# Patient Record
Sex: MALE | Race: WHITE | Employment: FULL TIME | URBAN - METROPOLITAN AREA
[De-identification: names, ages, dates, MRNs, and addresses within clinical notes are randomized per-mention and may not be internally consistent; named-entity substitution may affect disease eponyms.]

---

## 2017-10-08 ENCOUNTER — HOSPITAL ENCOUNTER (OUTPATIENT)
Age: 37
Discharge: HOME | End: 2017-10-08
Attending: FAMILY MEDICINE
Payer: COMMERCIAL

## 2017-10-08 VITALS
HEART RATE: 81 BPM | HEIGHT: 76 IN | TEMPERATURE: 98 F | WEIGHT: 260 LBS | OXYGEN SATURATION: 99 % | DIASTOLIC BLOOD PRESSURE: 62 MMHG | SYSTOLIC BLOOD PRESSURE: 149 MMHG | RESPIRATION RATE: 18 BRPM | BODY MASS INDEX: 31.66 KG/M2

## 2017-10-08 DIAGNOSIS — H10.33 ACUTE CONJUNCTIVITIS OF BOTH EYES, UNSPECIFIED ACUTE CONJUNCTIVITIS TYPE: ICD-10-CM

## 2017-10-08 DIAGNOSIS — J02.9 VIRAL PHARYNGITIS: Primary | ICD-10-CM

## 2017-10-08 PROCEDURE — 87430 STREP A AG IA: CPT

## 2017-10-08 PROCEDURE — 99204 OFFICE O/P NEW MOD 45 MIN: CPT

## 2017-10-08 PROCEDURE — 99203 OFFICE O/P NEW LOW 30 MIN: CPT

## 2017-10-08 RX ORDER — TOBRAMYCIN 3 MG/ML
2 SOLUTION/ DROPS OPHTHALMIC EVERY 4 HOURS
Qty: 1 BOTTLE | Refills: 1 | Status: SHIPPED | OUTPATIENT
Start: 2017-10-08 | End: 2017-10-13

## 2017-10-08 NOTE — ED PROVIDER NOTES
Patient Seen in: [facility]    History   Patient presents with:  Sore Throat  Cough/URI    Stated Complaint: sore throat    Pt p/w co OU redness, irritation--onset w/OS 3 d ago, now OD x I d;  Also c/s.t. X 5 d, voice los for stated complaint: sore throat  Other systems are as noted in HPI. Constitutional and vital signs reviewed. All other systems reviewed and negative except as noted above.     PSFH elements reviewed from today and agreed except as otherwise stated i

## 2017-10-08 NOTE — ED INITIAL ASSESSMENT (HPI)
Sore throat for 5 days. No fever. 3 days of bilateral eye redness and tearing. \"Crusty\" drainage to both eyes. Does not wear corrective lenses. No N/V/D.

## 2024-01-08 ENCOUNTER — LAB ENCOUNTER (OUTPATIENT)
Dept: LAB | Age: 44
End: 2024-01-08
Attending: INTERNAL MEDICINE
Payer: COMMERCIAL

## 2024-01-08 ENCOUNTER — OFFICE VISIT (OUTPATIENT)
Dept: INTERNAL MEDICINE CLINIC | Facility: CLINIC | Age: 44
End: 2024-01-08

## 2024-01-08 VITALS
DIASTOLIC BLOOD PRESSURE: 72 MMHG | BODY MASS INDEX: 28.73 KG/M2 | WEIGHT: 238.38 LBS | SYSTOLIC BLOOD PRESSURE: 138 MMHG | RESPIRATION RATE: 17 BRPM | HEIGHT: 76.5 IN | HEART RATE: 80 BPM

## 2024-01-08 DIAGNOSIS — Z00.00 ANNUAL PHYSICAL EXAM: Primary | ICD-10-CM

## 2024-01-08 DIAGNOSIS — Z00.00 ANNUAL PHYSICAL EXAM: ICD-10-CM

## 2024-01-08 PROBLEM — N52.9 ERECTILE DYSFUNCTION: Status: ACTIVE | Noted: 2024-01-08

## 2024-01-08 LAB
ALBUMIN SERPL-MCNC: 4.9 G/DL (ref 3.2–4.8)
ALBUMIN/GLOB SERPL: 1.8 {RATIO} (ref 1–2)
ALP LIVER SERPL-CCNC: 60 U/L
ALT SERPL-CCNC: 16 U/L
ANION GAP SERPL CALC-SCNC: 7 MMOL/L (ref 0–18)
AST SERPL-CCNC: 16 U/L (ref ?–34)
BILIRUB SERPL-MCNC: 0.6 MG/DL (ref 0.3–1.2)
BUN BLD-MCNC: 19 MG/DL (ref 9–23)
BUN/CREAT SERPL: 15.7 (ref 10–20)
CALCIUM BLD-MCNC: 10.2 MG/DL (ref 8.7–10.4)
CHLORIDE SERPL-SCNC: 104 MMOL/L (ref 98–112)
CHOLEST SERPL-MCNC: 193 MG/DL (ref ?–200)
CO2 SERPL-SCNC: 28 MMOL/L (ref 21–32)
CREAT BLD-MCNC: 1.21 MG/DL
DEPRECATED RDW RBC AUTO: 40.9 FL (ref 35.1–46.3)
EGFRCR SERPLBLD CKD-EPI 2021: 77 ML/MIN/1.73M2 (ref 60–?)
ERYTHROCYTE [DISTWIDTH] IN BLOOD BY AUTOMATED COUNT: 12.9 % (ref 11–15)
FASTING PATIENT LIPID ANSWER: YES
FASTING STATUS PATIENT QL REPORTED: YES
GLOBULIN PLAS-MCNC: 2.7 G/DL (ref 2.8–4.4)
GLUCOSE BLD-MCNC: 114 MG/DL (ref 70–99)
HCT VFR BLD AUTO: 47 %
HDLC SERPL-MCNC: 68 MG/DL (ref 40–59)
HGB BLD-MCNC: 15.9 G/DL
LDLC SERPL CALC-MCNC: 110 MG/DL (ref ?–100)
MCH RBC QN AUTO: 29.3 PG (ref 26–34)
MCHC RBC AUTO-ENTMCNC: 33.8 G/DL (ref 31–37)
MCV RBC AUTO: 86.6 FL
NONHDLC SERPL-MCNC: 125 MG/DL (ref ?–130)
OSMOLALITY SERPL CALC.SUM OF ELEC: 291 MOSM/KG (ref 275–295)
PLATELET # BLD AUTO: 297 10(3)UL (ref 150–450)
POTASSIUM SERPL-SCNC: 4.8 MMOL/L (ref 3.5–5.1)
PROT SERPL-MCNC: 7.6 G/DL (ref 5.7–8.2)
RBC # BLD AUTO: 5.43 X10(6)UL
SODIUM SERPL-SCNC: 139 MMOL/L (ref 136–145)
TRIGL SERPL-MCNC: 81 MG/DL (ref 30–149)
TSI SER-ACNC: 1.69 MIU/ML (ref 0.55–4.78)
VLDLC SERPL CALC-MCNC: 14 MG/DL (ref 0–30)
WBC # BLD AUTO: 6 X10(3) UL (ref 4–11)

## 2024-01-08 PROCEDURE — 3075F SYST BP GE 130 - 139MM HG: CPT | Performed by: INTERNAL MEDICINE

## 2024-01-08 PROCEDURE — 99386 PREV VISIT NEW AGE 40-64: CPT | Performed by: INTERNAL MEDICINE

## 2024-01-08 PROCEDURE — 80061 LIPID PANEL: CPT

## 2024-01-08 PROCEDURE — 3078F DIAST BP <80 MM HG: CPT | Performed by: INTERNAL MEDICINE

## 2024-01-08 PROCEDURE — 3008F BODY MASS INDEX DOCD: CPT | Performed by: INTERNAL MEDICINE

## 2024-01-08 PROCEDURE — 85027 COMPLETE CBC AUTOMATED: CPT

## 2024-01-08 PROCEDURE — 84443 ASSAY THYROID STIM HORMONE: CPT

## 2024-01-08 PROCEDURE — 36415 COLL VENOUS BLD VENIPUNCTURE: CPT

## 2024-01-08 PROCEDURE — 80053 COMPREHEN METABOLIC PANEL: CPT

## 2024-01-08 RX ORDER — SILDENAFIL 50 MG/1
50 TABLET, FILM COATED ORAL
COMMUNITY
Start: 2023-11-18

## 2024-01-08 NOTE — PATIENT INSTRUCTIONS
Your physical today was normal.  The small lump in your right testis is likely an epididymal cyst.  Please contact me if size increases.  Await results of blood tests.  Continue healthy diet and regular exercise.  Annual physical.

## 2024-01-08 NOTE — H&P
Stephen Chowdhury is a 43 year old male who presents for a complete physical exam.   HPI:   He has not had a physical in several years.  Recently he has noticed a slightly tender small lump at the top of his right testis.  Otherwise he has been feeling well.  No other issues for discussion today.    No significant past medical history.  Only previous surgery was right pyeloplasty for a \"blocked ureter\" in 2014.  No other hospitalizations.  Current medications reviewed, only generic Viagra as needed, prescribed online.  No medication allergies.    Diet healthy.  He exercises daily on a [place] bike.  Weight down 60 pounds intentionally in the past 3-4 years, now stable for about 1 year.    Tdap vaccine October 2016.  Influenza vaccine November 2023.  Has received 2 COVID vaccines (Pfizer) but not yet a booster.    Wt Readings from Last 4 Encounters:   01/08/24 238 lb 6 oz (108.1 kg)   10/08/17 260 lb (117.9 kg)     Body mass index is 28.64 kg/m².     Current Outpatient Medications   Medication Sig Dispense Refill    Sildenafil Citrate 50 MG Oral Tab Take 1 tablet (50 mg total) by mouth daily as needed.       No Known Allergies   Past Medical History:   Diagnosis Date    Erectile dysfunction       Past Surgical History:   Procedure Laterality Date    LAPAROSCOPY, SURGICAL; PYELOPLASTY Right 2014    \"Blocked ureter\"      History reviewed. No pertinent family history.   Social History:  Social History     Socioeconomic History    Marital status: Single   Tobacco Use    Smoking status: Never     Passive exposure: Never    Smokeless tobacco: Never   Vaping Use    Vaping Use: Never used   Substance and Sexual Activity    Alcohol use: Yes     Comment: Occasional    Drug use: Never           REVIEW OF SYSTEMS:   GENERAL: No fever  LUNGS: No cough wheezing or shortness of breath  CARDIAC: No lightheadedness palpitations or chest pain  GI: No anorexia heartburn dysphagia nausea vomiting abdominal pain diarrhea constipation or  rectal bleeding  : No urinary frequency dysuria or hematuria  MUSCULOSKELETAL: No leg swelling  NEURO: No headaches    EXAM:   GENERAL: Pleasant male appearing well in no distress  /72   Pulse 80   Resp 17   Ht 6' 4.5\" (1.943 m)   Wt 238 lb 6 oz (108.1 kg)   BMI 28.64 kg/m²   HEENT: Anicteric, conjunctiva pink, oropharynx normal  NECK: Supple without mass or thyromegaly  NODES: No peripheral adenopathy  LUNGS: Resonant to percussion and clear to auscultation  CARDIAC: Rhythm regular S1 S2 normal without murmur or edema  ABDOMEN: Bowel sounds normal soft nontender without mass or hepatosplenomegaly  EXTREMITIES: Normal without cyanosis or clubbing  PULSES: 2+ bilateral dorsalis pedis and posterior tibial  NEURO: Reflexes 2+ bilaterally and symmetric   GENITAL: Testes normal with a tiny approximately 1 mm nodule superior aspect right testis without testis mass or inguinal hernia    ASSESSMENT AND PLAN:   Stephen Chowdhury is a 43 year old male who presents for a complete physical exam.     1. Annual physical exam  Recommend COVID booster.  He will consider but declines currently.  Check CMP CBC lipid profile and TSH with reflex T4 today.  Order sent.  Reinforced healthy diet and regular exercise.  Annual physical.  Colorectal cancer screening to begin at age 45.  Discussed with him.  Discussed that tiny right testicular nodule is likely an epididymal cyst.  Discussed testicular ultrasound but agreed to defer for now with close observation.  - Comp Metabolic Panel (14); Future  - CBC, Platelet; No Differential; Future  - Lipid Panel; Future  - TSH W Reflex To Free T4; Future      Artie Evangelista MD  1/8/2024  12:54 PM

## 2025-01-14 NOTE — PROGRESS NOTES
FAMILY MEDICINE CLINIC NOTE    HPI  Stephen Chowdhury is a 44 year old male presenting for physical    #Health Maintenance  -Diet: Tries to eat healthy. Tries to cook meals at home. Eats vegetables. Occasional fast food.   -Exercise: [place] 3-4 times a week, weight training  -Lung cancer screen: Not indicated  -Colon cancer screen: Not indicated  -Prostate cancer screen: Not indicated  -Aortic aneurysm screen: Not indicated  -Statin:  Will check lipid panel  -ASA: Not indicated  -HIV screen: Indicated  -Hep C screen: Indicated  -Gonorrhea/chlamydia:  Not indicated  -Syphillis: Not indicated  -TB: Not indicated  -Tobacco/alcohol: Per below  -Depression: PHQ-2 score of 0 (score >/= 3 do PHQ-9)  -Advanced Directive: Indicated    #Immunizations  -Tdap:  10/2016  -Flu shot:  11/2024  -PCV13: Not indicated   -PCV20: Not indicated   -PPSV23: Not indicated   -HPV: Not indicated  -RZV (preferred) or VZL: Not indicated   -RSV: Not indicated   -COVID: Indicated    #ED  -sildenafil 50 mg as needed    #URI  -history of cold after Carlos  -lingering discomfort  -slight coughing  -no CP, no SOB    #Patient Care Team  Patient Care Team:  Artie Evangelista MD as PCP - General (Internal Medicine)    ROS  GENERAL: No fever/chills, no recent weight loss   HEENT: No visual changes, no changes in hearing, no sore throats  NECK: No pain, no swelling  RESP: No cough, no SOB  CV: No chest pain, no palpitations  GI: No abd pain, no N/V/D  MSK: No edema, no pain  SKIN: No new rashes  NEURO: No numbness, no tingling, no HA    HEALTH MAINTENANCE CHECKLIST  Health Maintenance Topics with due status: Overdue       Topic Date Due    COVID-19 Vaccine 09/01/2024    Influenza Vaccine 10/01/2024    Annual Depression Screening 01/01/2025    Annual Physical 01/08/2025       ALLERGIES  Allergies[1]    MEDICATIONS  Current Outpatient Medications   Medication Sig Dispense Refill    Sildenafil Citrate 50 MG Oral Tab Take 1 tablet (50 mg total) by mouth daily  as needed.         ACTIVE PROBLEM  Patient Active Problem List   Diagnosis    Erectile dysfunction    Health maintenance examination    Overweight (BMI 25.0-29.9)       PAST MEDICAL HISTORY  Past Medical History:    Erectile dysfunction       PAST SOCIAL HISTORY  Social History     Socioeconomic History    Marital status: Single     Spouse name: Not on file    Number of children: Not on file    Years of education: Not on file    Highest education level: Not on file   Occupational History    Not on file   Tobacco Use    Smoking status: Never     Passive exposure: Never    Smokeless tobacco: Never   Vaping Use    Vaping status: Never Used   Substance and Sexual Activity    Alcohol use: Yes     Comment: Occasional - a couple a month    Drug use: Never    Sexual activity: Yes     Partners: Female   Other Topics Concern    Not on file   Social History Narrative    Relationships:  - Mayra    Children: Kathia (12F) Violet (8F)    Pets: None    School: N/A    Work: Finance    Origin: G[place] up in [place].     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    Spiritual: Church         Social Drivers of Health     Financial Resource Strain: Not on file   Food Insecurity: Not on file   Transportation Needs: Not on file   Physical Activity: Not on file   Stress: Not on file   Social Connections: Not on file   Housing Stability: Not on file       PAST SURGICAL HISTORY  Past Surgical History:   Procedure Laterality Date    Laparoscopy, surgical; pyeloplasty Right 2014    \"Blocked ureter\"       PAST FAMILY HISTORY  Family History   Problem Relation Age of Onset    Colon Polyps Mother     Other (Parkinsons) Father     No Known Problems Sister     No Known Problems Sister     No Known Problems Sister     No Known Problems Maternal Grandmother     No Known Problems Maternal Grandfather     Heart Disease Paternal Grandmother     No Known Problems Paternal Grandfather     Colon Cancer Neg     Prostate Cancer Neg        PHYSICAL  EXAM  Vitals:    01/16/25 1204 01/16/25 1216   BP: (!) 140/92 120/88   Pulse: 86    Temp: 97.6 °F (36.4 °C)    SpO2: 98%    Weight: 229 lb (103.9 kg)    Height: 6' 4.5\" (1.943 m)       Body mass index is 27.51 kg/m².    GENERAL: NAD  HEENT: Moist mucous membranes, no tonsillar swelling or erythema, PERRLA bilat, TM translucent and non-bulging  NECK: Supple, non-tender  RESP: CTAB, no wheezing, no rales, no rhonchi  CV: RRR, no murmurs  GI: Soft, non-distended, non-tender, no guarding, no rebound, no masses  MSK: No edema  SKIN: Warm and dry, no rashes  NEURO: Answering questions appropriately    LABS  Lab Results   Component Value Date    WBC 6.0 01/08/2024    HGB 15.9 01/08/2024    HCT 47.0 01/08/2024    .0 01/08/2024       Lab Results   Component Value Date     01/08/2024    K 4.8 01/08/2024     01/08/2024    CO2 28.0 01/08/2024    ANIONGAP 7 01/08/2024    BUN 19 01/08/2024    CREATSERUM 1.21 01/08/2024    BUNCREA 15.7 01/08/2024     (H) 01/08/2024    CA 10.2 01/08/2024    OSMOCALC 291 01/08/2024    ALT 16 01/08/2024    AST 16 01/08/2024    ALKPHO 60 01/08/2024    BILT 0.6 01/08/2024    TP 7.6 01/08/2024    ALB 4.9 (H) 01/08/2024    GLOBULIN 2.7 (L) 01/08/2024    ELECTAG 1.8 01/08/2024    FASTING Yes 01/08/2024    FASTING Yes 01/08/2024         Lab Results   Component Value Date    CHOLEST 193 01/08/2024    TRIG 81 01/08/2024    HDL 68 (H) 01/08/2024     (H) 01/08/2024    VLDL 14 01/08/2024    NONHDLC 125 01/08/2024        DIAGNOSTICS    ASSESSMENT/PLAN  Problem List Items Addressed This Visit          Endocrine and Metabolic    Overweight (BMI 25.0-29.9)     Losing weight  Continue to focus on a healthy diet and exercise.         Relevant Orders    Comp Metabolic Panel (14)    Hemoglobin A1C    Lipid Panel    TSH W Reflex To Free T4       Genitourinary and Reproductive    Erectile dysfunction     Uses sildenafil 50 mg as needed.  Currently being managed by [facility], but if desiring me  to fill can notify me            EarNoseThroat    RESOLVED: Upper respiratory tract infection     Recovering from upper respiratory infection.  No concerns            Health Encounters    Health maintenance examination - Primary     Exercise and diet advised.  CBC, CMP, lipid panel, Hba1c, TSH, HIV screen, hepatitis C screen  COVID vaccine advised.  Advanced directive information provided.  Borderline blood pressures  Can monitor blood pressures at home - consider checking prior to all doctor office visits in the future.         Relevant Orders    Comp Metabolic Panel (14)    HCV Antibody    CBC With Differential With Platelet    Hemoglobin A1C    Lipid Panel    TSH W Reflex To Free T4    HIV Ag/Ab Combo       Return in about 1 year (around 1/16/2026) for physical.    Alfred Mercedes MD  Family Medicine         [1] No Known Allergies

## 2025-01-16 ENCOUNTER — OFFICE VISIT (OUTPATIENT)
Dept: INTERNAL MEDICINE CLINIC | Facility: CLINIC | Age: 45
End: 2025-01-16
Payer: COMMERCIAL

## 2025-01-16 ENCOUNTER — LAB ENCOUNTER (OUTPATIENT)
Dept: LAB | Age: 45
End: 2025-01-16
Attending: FAMILY MEDICINE
Payer: COMMERCIAL

## 2025-01-16 VITALS
HEIGHT: 76.5 IN | DIASTOLIC BLOOD PRESSURE: 88 MMHG | WEIGHT: 229 LBS | OXYGEN SATURATION: 98 % | TEMPERATURE: 98 F | BODY MASS INDEX: 27.6 KG/M2 | SYSTOLIC BLOOD PRESSURE: 120 MMHG | HEART RATE: 86 BPM

## 2025-01-16 DIAGNOSIS — N52.9 ERECTILE DYSFUNCTION, UNSPECIFIED ERECTILE DYSFUNCTION TYPE: ICD-10-CM

## 2025-01-16 DIAGNOSIS — E66.3 OVERWEIGHT (BMI 25.0-29.9): ICD-10-CM

## 2025-01-16 DIAGNOSIS — Z00.00 HEALTH MAINTENANCE EXAMINATION: ICD-10-CM

## 2025-01-16 DIAGNOSIS — J06.9 UPPER RESPIRATORY TRACT INFECTION, UNSPECIFIED TYPE: ICD-10-CM

## 2025-01-16 DIAGNOSIS — Z00.00 HEALTH MAINTENANCE EXAMINATION: Primary | ICD-10-CM

## 2025-01-16 LAB
ALBUMIN SERPL-MCNC: 5.4 G/DL (ref 3.2–4.8)
ALBUMIN/GLOB SERPL: 2.3 {RATIO} (ref 1–2)
ALP LIVER SERPL-CCNC: 67 U/L
ALT SERPL-CCNC: 12 U/L
ANION GAP SERPL CALC-SCNC: 9 MMOL/L (ref 0–18)
AST SERPL-CCNC: 15 U/L (ref ?–34)
BASOPHILS # BLD AUTO: 0.02 X10(3) UL (ref 0–0.2)
BASOPHILS NFR BLD AUTO: 0.3 %
BILIRUB SERPL-MCNC: 0.6 MG/DL (ref 0.3–1.2)
BUN BLD-MCNC: 20 MG/DL (ref 9–23)
BUN/CREAT SERPL: 16.9 (ref 10–20)
CALCIUM BLD-MCNC: 10.7 MG/DL (ref 8.7–10.4)
CHLORIDE SERPL-SCNC: 103 MMOL/L (ref 98–112)
CHOLEST SERPL-MCNC: 194 MG/DL (ref ?–200)
CO2 SERPL-SCNC: 30 MMOL/L (ref 21–32)
CREAT BLD-MCNC: 1.18 MG/DL
DEPRECATED RDW RBC AUTO: 37 FL (ref 35.1–46.3)
EGFRCR SERPLBLD CKD-EPI 2021: 79 ML/MIN/1.73M2 (ref 60–?)
EOSINOPHIL # BLD AUTO: 0.03 X10(3) UL (ref 0–0.7)
EOSINOPHIL NFR BLD AUTO: 0.5 %
ERYTHROCYTE [DISTWIDTH] IN BLOOD BY AUTOMATED COUNT: 12.1 % (ref 11–15)
EST. AVERAGE GLUCOSE BLD GHB EST-MCNC: 100 MG/DL (ref 68–126)
FASTING PATIENT LIPID ANSWER: YES
FASTING STATUS PATIENT QL REPORTED: YES
GLOBULIN PLAS-MCNC: 2.4 G/DL (ref 2–3.5)
GLUCOSE BLD-MCNC: 103 MG/DL (ref 70–99)
HBA1C MFR BLD: 5.1 % (ref ?–5.7)
HCT VFR BLD AUTO: 46.9 %
HCV AB SERPL QL IA: NONREACTIVE
HDLC SERPL-MCNC: 46 MG/DL (ref 40–59)
HGB BLD-MCNC: 17 G/DL
IMM GRANULOCYTES # BLD AUTO: 0.02 X10(3) UL (ref 0–1)
IMM GRANULOCYTES NFR BLD: 0.3 %
LDLC SERPL CALC-MCNC: 136 MG/DL (ref ?–100)
LYMPHOCYTES # BLD AUTO: 1.28 X10(3) UL (ref 1–4)
LYMPHOCYTES NFR BLD AUTO: 20.3 %
MCH RBC QN AUTO: 30.7 PG (ref 26–34)
MCHC RBC AUTO-ENTMCNC: 36.2 G/DL (ref 31–37)
MCV RBC AUTO: 84.7 FL
MONOCYTES # BLD AUTO: 0.33 X10(3) UL (ref 0.1–1)
MONOCYTES NFR BLD AUTO: 5.2 %
NEUTROPHILS # BLD AUTO: 4.63 X10 (3) UL (ref 1.5–7.7)
NEUTROPHILS # BLD AUTO: 4.63 X10(3) UL (ref 1.5–7.7)
NEUTROPHILS NFR BLD AUTO: 73.4 %
NONHDLC SERPL-MCNC: 148 MG/DL (ref ?–130)
OSMOLALITY SERPL CALC.SUM OF ELEC: 297 MOSM/KG (ref 275–295)
PLATELET # BLD AUTO: 288 10(3)UL (ref 150–450)
POTASSIUM SERPL-SCNC: 4.4 MMOL/L (ref 3.5–5.1)
PROT SERPL-MCNC: 7.8 G/DL (ref 5.7–8.2)
RBC # BLD AUTO: 5.54 X10(6)UL
SODIUM SERPL-SCNC: 142 MMOL/L (ref 136–145)
TRIGL SERPL-MCNC: 66 MG/DL (ref 30–149)
TSI SER-ACNC: 1.52 UIU/ML (ref 0.55–4.78)
VLDLC SERPL CALC-MCNC: 12 MG/DL (ref 0–30)
WBC # BLD AUTO: 6.3 X10(3) UL (ref 4–11)

## 2025-01-16 PROCEDURE — 83036 HEMOGLOBIN GLYCOSYLATED A1C: CPT

## 2025-01-16 PROCEDURE — 84443 ASSAY THYROID STIM HORMONE: CPT

## 2025-01-16 PROCEDURE — 36415 COLL VENOUS BLD VENIPUNCTURE: CPT

## 2025-01-16 PROCEDURE — 87389 HIV-1 AG W/HIV-1&-2 AB AG IA: CPT

## 2025-01-16 PROCEDURE — 86803 HEPATITIS C AB TEST: CPT

## 2025-01-16 PROCEDURE — 80061 LIPID PANEL: CPT

## 2025-01-16 PROCEDURE — 80053 COMPREHEN METABOLIC PANEL: CPT

## 2025-01-16 PROCEDURE — 85025 COMPLETE CBC W/AUTO DIFF WBC: CPT

## 2025-01-16 NOTE — ASSESSMENT & PLAN NOTE
Uses sildenafil 50 mg as needed.  Currently being managed by [facility], but if desiring me to fill can notify me

## 2025-01-16 NOTE — PATIENT INSTRUCTIONS
PATIENT INSTRUCTIONS    Thank you for seeing me today, it was a pleasure taking care of you.  Please check out at the  and schedule a follow up appointment.  Return in about 1 year (around 1/16/2026) for physical.  Please remember that the preferred [place] period for appointments is 5 minutes. This is to help maximize the amount of time that we can spend together at our visits.    Please get your labs drawn at your preferred lab.  The following imaging studies were ordered: None  Please also follow up with the following specialists: None  Please fill out the advance directive information (power of  documents) and bring a copy to our clinic.  Continue to focus on a healthy diet and exercise  Consider COVID vaccine  Can use sildenafil as needed, if desiring me to fill - no problems    Best,   Dr. Daren FORDOR LABS AND IMAGING INFORMATION    Here are some lab and imaging locations available to you. Please note that some of the times and availabilities are subject to change. Please refer to the [facility] webpage for the most recent updates. There are also additional locations that can be found on the [facility] webpage.    To schedule a lab appointment, please call [phone].    To schedule an imaging appointment, please call [phone], option 2      [address] [address]    Lab Hours  Monday - Friday 7:30am - 5pm  Saturday 6:30am - 3pm    X-ray Hours  Call [phone] for hours or to schedule      [facility]  [address] [place] [address]    Lab Hours  Monday - Friday 6:30am - 8pm  Saturday 6:30am - 3pm  Sunday 6:30am - 3pm    X-ray Hours  Call [phone] for hours or to schedule      [facility]  [address] [place] [address]    Lab Hours  Monday - Friday 7:30am - 5pm  Saturday 7:30am - 11:30am    X-ray Hours  None at  this location      [facility] [place]  [address] [place] [address]    Lab Hours  Monday - Friday 8am - 12pm    X-ray Hours  Call [phone] for hours or to schedule      [facility] [place]  [address] [place] [address]    Lab Hours  Monday - Friday 7:30am - 5pm  Saturday 6:30am - 3pm    X-ray Hours  Call [phone] for hours or to schedule      [facility] [place]  [address]  [place] [address]    Lab Hours  Monday - Friday 7:30am - 4pm (closed 12:15pm - 1pm)    X-ray Hours  Call [phone] for hours or to schedule      [facility] [place]  [address]  [address]  [place] [address]    Lab Hours  Monday - Friday 8am - 4:30pm (closed 12:15pm - 1pm)    X-ray Hours  None at this location

## 2025-01-16 NOTE — ASSESSMENT & PLAN NOTE
Exercise and diet advised.  CBC, CMP, lipid panel, Hba1c, TSH, HIV screen, hepatitis C screen  COVID vaccine advised.  Advanced directive information provided.  Borderline blood pressures  Can monitor blood pressures at home - consider checking prior to all doctor office visits in the future.

## 2025-01-17 ENCOUNTER — TELEPHONE (OUTPATIENT)
Dept: INTERNAL MEDICINE CLINIC | Facility: CLINIC | Age: 45
End: 2025-01-17

## 2025-01-17 NOTE — TELEPHONE ENCOUNTER
Spoke with Chaitanya to notify him Dr. Mercedes sent [employer] message regarding his lab results and recommendations. Chaitanya states he did see the message and did not have any questions.